# Patient Record
Sex: FEMALE
[De-identification: names, ages, dates, MRNs, and addresses within clinical notes are randomized per-mention and may not be internally consistent; named-entity substitution may affect disease eponyms.]

---

## 2024-06-05 ENCOUNTER — TRANSCRIPTION ENCOUNTER (OUTPATIENT)
Age: 54
End: 2024-06-05

## 2024-06-05 ENCOUNTER — APPOINTMENT (OUTPATIENT)
Dept: BREAST CENTER | Facility: CLINIC | Age: 54
End: 2024-06-05
Payer: COMMERCIAL

## 2024-06-05 VITALS
HEIGHT: 66 IN | WEIGHT: 183 LBS | DIASTOLIC BLOOD PRESSURE: 75 MMHG | HEART RATE: 77 BPM | SYSTOLIC BLOOD PRESSURE: 115 MMHG | BODY MASS INDEX: 29.41 KG/M2

## 2024-06-05 DIAGNOSIS — N63.0 UNSPECIFIED LUMP IN UNSPECIFIED BREAST: ICD-10-CM

## 2024-06-05 DIAGNOSIS — Z91.89 OTHER SPECIFIED PERSONAL RISK FACTORS, NOT ELSEWHERE CLASSIFIED: ICD-10-CM

## 2024-06-05 DIAGNOSIS — R92.8 OTHER ABNORMAL AND INCONCLUSIVE FINDINGS ON DIAGNOSTIC IMAGING OF BREAST: ICD-10-CM

## 2024-06-05 DIAGNOSIS — Z80.3 FAMILY HISTORY OF MALIGNANT NEOPLASM OF BREAST: ICD-10-CM

## 2024-06-05 DIAGNOSIS — R92.30 DENSE BREASTS, UNSPECIFIED: ICD-10-CM

## 2024-06-05 DIAGNOSIS — Z80.41 FAMILY HISTORY OF MALIGNANT NEOPLASM OF OVARY: ICD-10-CM

## 2024-06-05 PROBLEM — Z00.00 ENCOUNTER FOR PREVENTIVE HEALTH EXAMINATION: Status: ACTIVE | Noted: 2024-06-05

## 2024-06-05 PROCEDURE — 99204 OFFICE O/P NEW MOD 45 MIN: CPT

## 2024-06-05 PROCEDURE — 76642 ULTRASOUND BREAST LIMITED: CPT

## 2024-06-05 RX ORDER — ESTRADIOL 0.1 MG/D
0.1 PATCH, EXTENDED RELEASE TRANSDERMAL
Refills: 0 | Status: ACTIVE | COMMUNITY

## 2024-06-05 RX ORDER — LINACLOTIDE 290 UG/1
290 CAPSULE, GELATIN COATED ORAL
Refills: 0 | Status: ACTIVE | COMMUNITY

## 2024-06-05 RX ORDER — OMEPRAZOLE MAGNESIUM 20 MG/1
TABLET, DELAYED RELEASE ORAL
Refills: 0 | Status: ACTIVE | COMMUNITY

## 2024-06-05 RX ORDER — PROGESTERONE 100 MG/1
100 CAPSULE ORAL
Refills: 0 | Status: ACTIVE | COMMUNITY

## 2024-06-05 NOTE — DATA REVIEWED
[FreeTextEntry1] : 12/13/23 ([facility]) B/L sMMG/US: heterogeneously dense.  - Stable postsurgical architectural distortion of the left breast.  - Right breast at the 9:00 axis 3 cm from the nipple, there is a 0.5 x 0.4 x 0.7 cm unremarkable mass,, which measured 0.7 x 0.3 x 0.7 cm on ultrasound from 12/2/2022 (where this was labeled to be at the 9:00 axis 2 cm from the nipple). Continued sonographic follow-up at time of patient's bilateral annual mammogram in one year is recommended. - Left breast at the 9:00 axis 3 cm from the nipple, there is a 0.6 x 0.3 x 0.9 cm oval, circumscribed, hypoechoic, avascular mass in parallel orientation, which appears stable in size and more hypoechoic when compared to prior ultrasound from 12/9/2022. Continued sonographic follow-up at time of patient's bilateral annual mammogram in one year is recommended. IMPRESSION: Probably benign bilateral breast masses (R9N3 and L9N3), for which continued sonographic follow-up 6 at time of patient's bilateral annual mammogram in one year is recommended. FOLLOW-UP: Follow-up imaging in 12 months. Annual screening breast MRI is recommended in addition to mammography for women with a lifetime risk of breast cancer greater than 20%. Given dense breast tissue and strong family history of breast carcinoma, the patient is encouraged to discuss their individual risk of breast cancer with their referring health care provider. BI-RADS 3:  Probably benign.

## 2024-06-05 NOTE — FAMILY HISTORY
[TextEntry] : Sister 1 breast cancer age 31  Sister 2 breast cancer age 25  Paternal aunt breast cancer age 50's  Paternal cousin breast cancer age 41 Mother liver cancer 59 Maternal aunt breast cancer age 60's

## 2024-06-05 NOTE — HISTORY OF PRESENT ILLNESS
[FreeTextEntry1] : 54 year old female was referred by Dr. Claudia Sinclair who presents for initial evaluation regarding left UIQ breast lump and constant pain since April 2024. Denies any fever, chills, redness, pain, or discharge at the incision site. Patient denies nipple discharge, skin changes.  Most recent imaging: B/L sMMG/US 12/13/23 BIRADS-3 revealed probably benign b/l breast masses (0.7cm at R9N3 and 0.9cm at L9N3), with recommendation for continued short term f/u US at the time of patient's bilateral annual mammogram.   Patient with history of benign excisional left breast biopsy in 2005.  Famhx: Sister 1 breast cancer age 31 (BRCA+) Sister 2 breast cancer age 25 (BRCA+) Paternal aunt breast cancer age 50's  Paternal cousin breast cancer age 41 Mother liver cancer 59 Maternal aunt breast cancer age 60's  Patient had [employer] genetic testing performed in 2014, negative for pathogenic mutations; VUS in APC gene. Dilma Lifetime Risk 24.4%

## 2024-06-05 NOTE — PAST MEDICAL HISTORY
[Surgical Menopause] : The patient is in surgical menopause [Menarche Age ____] : age at menarche was [unfilled] [Total Preg ___] : G[unfilled] [Live Births ___] : P[unfilled]  [Ectopics ___] : ectopic pregnancies: [unfilled]  [Age At Live Birth ___] : Age at live birth: [unfilled] [History of Hormone Replacement Treatment] : has no history of hormone replacement treatment [de-identified] : partial hysterectomy [FreeTextEntry6] : No [FreeTextEntry7] : Yes in the past  [FreeTextEntry8] : No

## 2024-06-05 NOTE — ASSESSMENT
[FreeTextEntry1] : 54 year old female presents for initial evaluation regarding left breast lump and constant pain since April 2024. Patient with history of benign excisional left breast biopsy in 2005.   Patient with a strong family history of breast cancer, lifetime SHERYL risk of 24.4%. Discussed with the patient the implications for her lifetime risk, which is considered to be at high risk to develop breast cancer over the span of her lifetime and it is recommended that she undergoes high risk screening surveillance to include biannual radiological screening exams with a mammogram and screening MRI.   Discussed the standard recommendation to limit alcohol intake, follow a low-fat diet, avoid smoking, 150 minutes of exercise weekly. Reviewed prevention options from lifestyle, radiological surveillance, anti-estrogen maintenance, and prophylactic mastectomy.  Most recent imaging reviewed, B/L sMMG/US 12/13/23 BIRADS-3 revealed probably benign b/l breast masses (0.7cm at R9N3 and 0.9cm at L9N3), with recommendation for continued short term f/u US at the time of patient's bilateral annual mammogram.   Patient had [employer] genetic testing performed in 2014, negative for pathogenic mutations; VUS in APC gene.   L breast US performed in office today visualizing irregular hypoechoic mass a 10n4 at patient's area of concern. Plan for targeted L US and L US-guided biopsy as well as B/L high risk MRI now, and re-examination in 1 month. Patient verbalizes understanding and is in agreement with the plan.

## 2024-06-05 NOTE — CONSULT LETTER
[Dear  ___] : Dear ~MANNY, [Consult Letter:] : I had the pleasure of evaluating your patient, [unfilled]. [Please see my note below.] : Please see my note below. [Consult Closing:] : Thank you very much for allowing me to participate in the care of this patient.  If you have any questions, please do not hesitate to contact me. [Sincerely,] : Sincerely, [FreeTextEntry2] : Dr. Claudia Sinclair [FreeTextEntry3] : Ghanshyam Oneil MD Chief of Breast Surgery Director of Breast Cancer Program [facility]

## 2024-06-05 NOTE — PROCEDURE
[FreeTextEntry1] : L breast US  [FreeTextEntry2] : L breast lump and pain  [FreeTextEntry3] : Technique: a targeted left breast ultrasound was performed with evaluation of the UIQ US Findings: left breast irregular hypoechoic mass at 10 :00 4cmFN was detected 1.5cm x 0.8cm longitudinal in dimension No suspicious solid or complex cystic masses were detected

## 2024-06-12 ENCOUNTER — NON-APPOINTMENT (OUTPATIENT)
Age: 54
End: 2024-06-12

## 2024-06-20 ENCOUNTER — TRANSCRIPTION ENCOUNTER (OUTPATIENT)
Age: 54
End: 2024-06-20

## 2024-07-01 ENCOUNTER — NON-APPOINTMENT (OUTPATIENT)
Age: 54
End: 2024-07-01

## 2024-07-29 PROBLEM — Z98.890 S/P BREAST BIOPSY: Status: ACTIVE | Noted: 2024-07-29

## 2024-07-29 NOTE — PHYSICAL EXAM
[Supple] : supple [No Supraclavicular Adenopathy] : no supraclavicular adenopathy [Examined in the supine and seated position] : examined in the supine and seated position [No dominant masses] : no dominant masses in right breast  [No Nipple Retraction] : no left nipple retraction [No Nipple Discharge] : no left nipple discharge [No Axillary Lymphadenopathy] : no left axillary lymphadenopathy [No dominant masses] : no dominant masses left breast

## 2024-07-29 NOTE — REVIEW OF SYSTEMS
Alternate tylenol and ibuprofen as needed for pain.  Alternate ice and heat.   Follow-up with PCP within the next few days in order to be re-evaluated.  Return to the ED with any new or worsening symptoms.    [As Noted in HPI] : as noted in HPI [Negative] : Constitutional

## 2024-07-31 ENCOUNTER — APPOINTMENT (OUTPATIENT)
Dept: BREAST CENTER | Facility: CLINIC | Age: 54
End: 2024-07-31
Payer: COMMERCIAL

## 2024-07-31 VITALS
SYSTOLIC BLOOD PRESSURE: 115 MMHG | BODY MASS INDEX: 29.15 KG/M2 | HEIGHT: 66 IN | DIASTOLIC BLOOD PRESSURE: 63 MMHG | HEART RATE: 75 BPM | WEIGHT: 181.38 LBS

## 2024-07-31 DIAGNOSIS — Z91.89 OTHER SPECIFIED PERSONAL RISK FACTORS, NOT ELSEWHERE CLASSIFIED: ICD-10-CM

## 2024-07-31 DIAGNOSIS — Z80.3 FAMILY HISTORY OF MALIGNANT NEOPLASM OF BREAST: ICD-10-CM

## 2024-07-31 DIAGNOSIS — Z98.890 OTHER SPECIFIED POSTPROCEDURAL STATES: ICD-10-CM

## 2024-07-31 PROCEDURE — 99213 OFFICE O/P EST LOW 20 MIN: CPT

## 2024-08-02 NOTE — PAST MEDICAL HISTORY
[Surgical Menopause] : The patient is in surgical menopause [Menarche Age ____] : age at menarche was [unfilled] [Total Preg ___] : G[unfilled] [Live Births ___] : P[unfilled]  [Ectopics ___] : ectopic pregnancies: [unfilled]  [Age At Live Birth ___] : Age at live birth: [unfilled] [History of Hormone Replacement Treatment] : has no history of hormone replacement treatment [de-identified] : partial hysterectomy [FreeTextEntry7] : Yes in the past  [FreeTextEntry6] : No [FreeTextEntry8] : No

## 2024-08-02 NOTE — FAMILY HISTORY
[FreeTextEntry1] : Ms. HEAD is a 74 year old female who returns today in follow up with regard to a history of type 2 dm. She did have 2 coronary stents placed in January and then had gastric ulcer requiring iron transfusions Now on Plavix and Eliquis. Current dm medication include Metformin 500mg BID, Tradjenta 5mg and Glimepiride 1mg daily. HGM of late has shown values to be running  130s in the am. She reports she is checking every  day. There has been no significant hypoglycemia or hypoglycemic symptoms. Patient has gained 10 lbs since June. \par Denies any chest pain, sob, neurologic or ophthalmologic complaints. She too denies any new podiatric concerns. She is up to date with his ophthalmologic visit- Saw optjose June told stable-todl of beginning deion dash. Dr Yuen\par POCT A1c returned today at 5.6%  ; previously 6.2% from 8/5/2021\par POCT glucose returned today at  130  mg/dL \par Additional medical history includes that of hypertension, hyperlipidemia, and obesity along with with a-fib and vitamin d deficiency Is on D3 2,000 iu daily.\par Did have 2nd bout of kidney stone-dx as uric acid and is on potassium citrate.\par \par She was having some swelling to bilateral lower extremities and was started on lasix by . \par \par took diuretic and stayin nina lasix 40 mgg-,bnpp today. \par Had both doses of covid vaccine as well as booster,  [TextEntry] : Sister 1 breast cancer age 31  Sister 2 breast cancer age 25  Paternal aunt breast cancer age 50's  Paternal cousin breast cancer age 41 Mother liver cancer 59 Maternal aunt breast cancer age 60's

## 2024-08-02 NOTE — PAST MEDICAL HISTORY
[Surgical Menopause] : The patient is in surgical menopause [Menarche Age ____] : age at menarche was [unfilled] [Total Preg ___] : G[unfilled] [Live Births ___] : P[unfilled]  [Ectopics ___] : ectopic pregnancies: [unfilled]  [Age At Live Birth ___] : Age at live birth: [unfilled] [History of Hormone Replacement Treatment] : has no history of hormone replacement treatment [de-identified] : partial hysterectomy [FreeTextEntry7] : Yes in the past  [FreeTextEntry6] : No [FreeTextEntry8] : No

## 2024-08-02 NOTE — DATA REVIEWED
[FreeTextEntry1] : 12/13/23 ([facility]) B/L sMMG/US: heterogeneously dense.  - Stable postsurgical architectural distortion of the left breast.  - Right breast at the 9:00 axis 3 cm from the nipple, there is a 0.5 x 0.4 x 0.7 cm unremarkable mass,, which measured 0.7 x 0.3 x 0.7 cm on ultrasound from 12/2/2022 (where this was labeled to be at the 9:00 axis 2 cm from the nipple). Continued sonographic follow-up at time of patient's bilateral annual mammogram in one year is recommended. - Left breast at the 9:00 axis 3 cm from the nipple, there is a 0.6 x 0.3 x 0.9 cm oval, circumscribed, hypoechoic, avascular mass in parallel orientation, which appears stable in size and more hypoechoic when compared to prior ultrasound from 12/9/2022. Continued sonographic follow-up at time of patient's bilateral annual mammogram in one year is recommended. IMPRESSION: Probably benign bilateral breast masses (R9N3 and L9N3), for which continued sonographic follow-up 6 at time of patient's bilateral annual mammogram in one year is recommended. FOLLOW-UP: Follow-up imaging in 12 months. Annual screening breast MRI is recommended in addition to mammography for women with a lifetime risk of breast cancer greater than 20%. Given dense breast tissue and strong family history of breast carcinoma, the patient is encouraged to discuss their individual risk of breast cancer with their referring health care provider. BI-RADS 3:  Probably benign.   6/12/24 ([facility]) L US-guided biopsy: breast tissue with dense stromal fibrosis, benign and concordant. Follow Up: 6 month f/u B/L DX MMG/US.   6/12/24 ([facility]) B/L MRI: 1.0cm enhancing skin lesion R 6:00 inframammary fold. BI-RADS 2, benign. Follow Up: clinical correlation/assessment.

## 2024-08-02 NOTE — ASSESSMENT
[FreeTextEntry1] : 54 year old female presents for follow up evaluation regarding left breast lump and constant pain since April 2024. Patient with history of benign excisional left breast biopsy in 2005.   B/L sMMG/US 12/13/23 BIRADS-3 revealed probably benign b/l breast masses (0.7cm at R9N3 and 0.9cm at L9N3), with recommendation for continued short term f/u US at the time of patient's bilateral annual mammogram. Bedside US performed in-office at [facility] yielded L 10:00 4cmfn irregular hypoechoic mass corresponding to patients area of concern. Follow up L US-guided biopsy (6/12/24) path revealed breast tissue with dense stromal fibrosis, benign and concordant, with recommendation for 6 month f/u B/L DX MMG/US. Patient also had B/L MRI on the same day (6/12/24), BI-RADS 2, yielding a 1.0cm enhancing skin lesion R 6:00 inframammary fold, recommending clinical correlation/assessment.  [employer] genetic testing in 2014, negative for pathogenic mutations; VUS in APC gene.   Patient with a strong family history of breast cancer, lifetime SHERYL risk of 24.4%. Patient without complaints. Physical exam WNL. Most recent imaging reviewed, as above. Plan for B/L DX MMG/US and re-examination in Dec 2024. Patient verbalizes understanding and is in agreement with the plan.

## 2024-08-02 NOTE — PAST MEDICAL HISTORY
[Surgical Menopause] : The patient is in surgical menopause [Menarche Age ____] : age at menarche was [unfilled] [Total Preg ___] : G[unfilled] [Live Births ___] : P[unfilled]  [Ectopics ___] : ectopic pregnancies: [unfilled]  [Age At Live Birth ___] : Age at live birth: [unfilled] [History of Hormone Replacement Treatment] : has no history of hormone replacement treatment [de-identified] : partial hysterectomy [FreeTextEntry7] : Yes in the past  [FreeTextEntry6] : No [FreeTextEntry8] : No

## 2024-08-02 NOTE — HISTORY OF PRESENT ILLNESS
[FreeTextEntry1] : 54 year old female, patient of Dr. Claudia Sinclair, presents for follow up evaluation regarding left UIQ breast lump and constant pain since April 2024. B/L sMMG/US 12/13/23 BIRADS-3 revealed probably benign b/l breast masses (0.7cm at R9N3 and 0.9cm at L9N3), with recommendation for continued short term f/u US at the time of patient's bilateral annual mammogram.   Bedside US performed in-office at [facility] yielded L 10:00 4cmfn irregular hypoechoic mass corresponding to patients area of concern. Follow up L US-guided biopsy (6/12/24) path revealed breast tissue with dense stromal fibrosis, benign and concordant, with recommendation for 6 month f/u B/L DX MMG/US. Patient also had B/L MRI on the same day (6/12/24), BI-RADS 2, yielding a 1.0cm enhancing skin lesion R 6:00 inframammary fold, recommending clinical correlation/assessment.  Patient denies nipple discharge or skin changes.  Patient with history of benign excisional left breast biopsy in 2005.  Famhx: Sister 1 breast cancer age 31 (BRCA+) Sister 2 breast cancer age 25 (BRCA+) Paternal aunt breast cancer age 50's  Paternal cousin breast cancer age 41 Maternal aunt breast cancer age 60's  Patient had [employer] genetic testing performed in 2014, negative for pathogenic mutations; VUS in APC gene. Dilma Lifetime Risk 24.4%

## 2024-08-02 NOTE — PAST MEDICAL HISTORY
[Surgical Menopause] : The patient is in surgical menopause [Menarche Age ____] : age at menarche was [unfilled] [Total Preg ___] : G[unfilled] [Live Births ___] : P[unfilled]  [Ectopics ___] : ectopic pregnancies: [unfilled]  [Age At Live Birth ___] : Age at live birth: [unfilled] [History of Hormone Replacement Treatment] : has no history of hormone replacement treatment [de-identified] : partial hysterectomy [FreeTextEntry7] : Yes in the past  [FreeTextEntry6] : No [FreeTextEntry8] : No

## 2024-08-02 NOTE — ASSESSMENT
[FreeTextEntry1] : 54 year old female presents for follow up evaluation regarding left breast lump and constant pain since April 2024. Patient with history of benign excisional left breast biopsy in 2005.   B/L sMMG/US 12/13/23 BIRADS-3 revealed probably benign b/l breast masses (0.7cm at R9N3 and 0.9cm at L9N3), with recommendation for continued short term f/u US at the time of patient's bilateral annual mammogram. Bedside US performed in-office at [facility] yielded L 10:00 4cmfn irregular hypoechoic mass corresponding to patients area of concern. Follow up L US-guided biopsy (6/12/24) path revealed breast tissue with dense stromal fibrosis, benign and concordant, with recommendation for 6 month f/u B/L DX MMG/US. Patient also had B/L MRI on the same day (6/12/24), BI-RADS 2, yielding a 1.0cm enhancing skin lesion R 6:00 inframammary fold, recommending clinical correlation/assessment.  [employer] genetic testing in 2014, negative for pathogenic mutations; VUS in APC gene.   Patient with a strong family history of breast cancer, lifetime SHERYL risk of 24.4%. Patient without complaints. Physical exam WNL. Most recent imaging reviewed, as above. Plan for B/L DX MMG/US and re-examination in Dec 2024. Patient verbalizes understanding and is in agreement with the plan.

## 2024-08-02 NOTE — DATA REVIEWED
[FreeTextEntry1] : 12/13/23 ([facility]) B/L sMMG/US: heterogeneously dense.  - Stable postsurgical architectural distortion of the left breast.  - Right breast at the 9:00 axis 3 cm from the nipple, there is a 0.5 x 0.4 x 0.7 cm unremarkable mass,, which measured 0.7 x 0.3 x 0.7 cm on ultrasound from 12/2/2022 (where this was labeled to be at the 9:00 axis 2 cm from the nipple). Continued sonographic follow-up at time of patient's bilateral annual mammogram in one year is recommended. - Left breast at the 9:00 axis 3 cm from the nipple, there is a 0.6 x 0.3 x 0.9 cm oval, circumscribed, hypoechoic, avascular mass in parallel orientation, which appears stable in size and more hypoechoic when compared to prior ultrasound from 12/9/2022. Continued sonographic follow-up at time of patient's bilateral annual mammogram in one year is recommended. IMPRESSION: Probably benign bilateral breast masses (R9N3 and L9N3), for which continued sonographic follow-up 6 at time of patient's bilateral annual mammogram in one year is recommended. FOLLOW-UP: Follow-up imaging in 12 months. Annual screening breast MRI is recommended in addition to mammography for women with a lifetime risk of breast cancer greater than 20%. Given dense breast tissue and strong family history of breast carcinoma, the patient is encouraged to discuss their individual risk of breast cancer with their referring health care provider. BI-RADS 3:  Probably benign.   6/12/24 ([facility]) L US-guided biopsy: breast tissue with dense stromal fibrosis, benign and concordant. Follow Up: 6 month f/u B/L DX MMG/US.   6/12/24 ([facility]) B/L MRI: 1.0cm enhancing skin lesion R 6:00 inframammary fold. BI-RADS 2, benign. Follow Up: clinical correlation/assessment.

## 2024-08-02 NOTE — ASSESSMENT
[FreeTextEntry1] : 54 year old female presents for follow up evaluation regarding left breast lump and constant pain since April 2024. Patient with history of benign excisional left breast biopsy in 2005.   B/L sMMG/US 12/13/23 BIRADS-3 revealed probably benign b/l breast masses (0.7cm at R9N3 and 0.9cm at L9N3), with recommendation for continued short term f/u US at the time of patient's bilateral annual mammogram. Bedside US performed in-office at [facility] yielded L 10:00 4cmfn irregular hypoechoic mass corresponding to patients area of concern. Follow up L US-guided biopsy (6/12/24) path revealed breast tissue with dense stromal fibrosis, benign and concordant, with recommendation for 6 month f/u B/L DX MMG/US. Patient also had B/L MRI on the same day (6/12/24), BI-RADS 2, yielding a 1.0cm enhancing skin lesion R 6:00 inframammary fold, recommending clinical correlation/assessment.  [employer] genetic testing in 2014, negative for pathogenic mutations; VUS in APC gene.   Patient with a strong family history of breast cancer, lifetime SHERYL risk of 24.4%. Patient without complaints. Physical exam WNL. Most recent imaging reviewed, as above. Plan for B/L DX MMG/US and re-examination in Dec 2024. Patient verbalizes understanding and is in agreement with the plan.

## 2024-08-02 NOTE — DATA REVIEWED
[FreeTextEntry1] : 12/13/23 ([facility]) B/L sMMG/US: heterogeneously dense.  - Stable postsurgical architectural distortion of the left breast.  - Right breast at the 9:00 axis 3 cm from the nipple, there is a 0.5 x 0.4 x 0.7 cm unremarkable mass,, which measured 0.7 x 0.3 x 0.7 cm on ultrasound from 12/2/2022 (where this was labeled to be at the 9:00 axis 2 cm from the nipple). Continued sonographic follow-up at time of patient's bilateral annual mammogram in one year is recommended. - Left breast at the 9:00 axis 3 cm from the nipple, there is a 0.6 x 0.3 x 0.9 cm oval, circumscribed, hypoechoic, avascular mass in parallel orientation, which appears stable in size and more hypoechoic when compared to prior ultrasound from 12/9/2022. Continued sonographic follow-up at time of patient's bilateral annual mammogram in one year is recommended. IMPRESSION: Probably benign bilateral breast masses (R9N3 and L9N3), for which continued sonographic follow-up 6 at time of patient's bilateral annual mammogram in one year is recommended. FOLLOW-UP: Follow-up imaging in 12 months. Annual screening breast MRI is recommended in addition to mammography for women with a lifetime risk of breast cancer greater than 20%. Given dense breast tissue and strong family history of breast carcinoma, the patient is encouraged to discuss their individual risk of breast cancer with their referring health care provider. BI-RADS 3:  Probably benign.   6/12/24 ([facility]) L US-guided biopsy: breast tissue with dense stromal fibrosis, benign and concordant. Follow Up: 6 month f/u B/L DX MMG/US.   6/12/24 ([facility]) B/L MRI: 1.0cm enhancing skin lesion R 6:00 inframammary fold. BI-RADS 2, benign. Follow Up: clinical correlation/assessment.

## 2024-08-02 NOTE — ASSESSMENT
[FreeTextEntry1] : 54 year old female presents for follow up evaluation regarding left breast lump and constant pain since April 2024. Patient with history of benign excisional left breast biopsy in 2005.   B/L sMMG/US 12/13/23 BIRADS-3 revealed probably benign b/l breast masses (0.7cm at R9N3 and 0.9cm at L9N3), with recommendation for continued short term f/u US at the time of patient's bilateral annual mammogram. Bedside US performed in-office at [facility] yielded L 10:00 4cmfn irregular hypoechoic mass corresponding to patients area of concern. Follow up L US-guided biopsy (6/12/24) path revealed breast tissue with dense stromal fibrosis, benign and concordant, with recommendation for 6 month f/u B/L DX MMG/US. Patient also had B/L MRI on the same day (6/12/24), BI-RADS 2, yielding a 1.0cm enhancing skin lesion R 6:00 inframammary fold, recommending clinical correlation/assessment.  [employer] genetic testing in 2014, negative for pathogenic mutations; VUS in APC gene.   Patient with a strong family history of breast cancer, lifetime SHERYL risk of 24.4%. Patient without complaints. Physical exam WNL. Most recent imaging reviewed, as above. Plan for B/L DX MMG/US and re-examination in Dec 2024. Patient verbalizes understanding and is in agreement with the plan.

## 2024-08-02 NOTE — DATA REVIEWED
[FreeTextEntry1] : 12/13/23 ([facility]) B/L sMMG/US: heterogeneously dense.  - Stable postsurgical architectural distortion of the left breast.  - Right breast at the 9:00 axis 3 cm from the nipple, there is a 0.5 x 0.4 x 0.7 cm unremarkable mass,, which measured 0.7 x 0.3 x 0.7 cm on ultrasound from 12/2/2022 (where this was labeled to be at the 9:00 axis 2 cm from the nipple). Continued sonographic follow-up at time of patient's bilateral annual mammogram in one year is recommended. - Left breast at the 9:00 axis 3 cm from the nipple, there is a 0.6 x 0.3 x 0.9 cm oval, circumscribed, hypoechoic, avascular mass in parallel orientation, which appears stable in size and more hypoechoic when compared to prior ultrasound from 12/9/2022. Continued sonographic follow-up at time of patient's bilateral annual mammogram in one year is recommended. IMPRESSION: Probably benign bilateral breast masses (R9N3 and L9N3), for which continued sonographic follow-up 6 at time of patient's bilateral annual mammogram in one year is recommended. FOLLOW-UP: Follow-up imaging in 12 months. Annual screening breast MRI is recommended in addition to mammography for women with a lifetime risk of breast cancer greater than 20%. Given dense breast tissue and strong family history of breast carcinoma, the patient is encouraged to discuss their individual risk of breast cancer with their referring health care provider. BI-RADS 3:  Probably benign.   6/12/24 ([facility]) L US-guided biopsy: breast tissue with dense stromal fibrosis, benign and concordant. Follow Up: 6 month f/u B/L DX MMG/US.   6/12/24 ([facility]) B/L MRI: 1.0cm enhancing skin lesion R 6:00 inframammary fold. BI-RADS 2, benign. Follow Up: clinical correlation/assessment.

## 2024-12-11 ENCOUNTER — EMERGENCY (EMERGENCY)
Facility: HOSPITAL | Age: 54
LOS: 1 days | Discharge: HOME | End: 2024-12-11
Attending: STUDENT IN AN ORGANIZED HEALTH CARE EDUCATION/TRAINING PROGRAM | Admitting: STUDENT IN AN ORGANIZED HEALTH CARE EDUCATION/TRAINING PROGRAM
Payer: COMMERCIAL

## 2024-12-11 VITALS
TEMPERATURE: 98 F | SYSTOLIC BLOOD PRESSURE: 190 MMHG | RESPIRATION RATE: 19 BRPM | HEART RATE: 85 BPM | DIASTOLIC BLOOD PRESSURE: 107 MMHG | HEIGHT: 66 IN | WEIGHT: 182.1 LBS | OXYGEN SATURATION: 99 %

## 2024-12-11 VITALS
SYSTOLIC BLOOD PRESSURE: 145 MMHG | OXYGEN SATURATION: 97 % | RESPIRATION RATE: 18 BRPM | TEMPERATURE: 98 F | HEART RATE: 54 BPM | DIASTOLIC BLOOD PRESSURE: 82 MMHG

## 2024-12-11 LAB
ANION GAP SERPL CALC-SCNC: 8 MMOL/L — SIGNIFICANT CHANGE UP (ref 5–17)
BASOPHILS # BLD AUTO: 0.04 K/UL — SIGNIFICANT CHANGE UP (ref 0–0.2)
BASOPHILS NFR BLD AUTO: 0.8 % — SIGNIFICANT CHANGE UP (ref 0–2)
BUN SERPL-MCNC: 10 MG/DL — SIGNIFICANT CHANGE UP (ref 7–23)
CALCIUM SERPL-MCNC: 9.4 MG/DL — SIGNIFICANT CHANGE UP (ref 8.4–10.5)
CHLORIDE SERPL-SCNC: 103 MMOL/L — SIGNIFICANT CHANGE UP (ref 96–108)
CO2 SERPL-SCNC: 26 MMOL/L — SIGNIFICANT CHANGE UP (ref 22–31)
CREAT SERPL-MCNC: 0.64 MG/DL — SIGNIFICANT CHANGE UP (ref 0.5–1.3)
D DIMER BLD IA.RAPID-MCNC: 209 NG/ML DDU — SIGNIFICANT CHANGE UP
EGFR: 104 ML/MIN/1.73M2 — SIGNIFICANT CHANGE UP
EOSINOPHIL # BLD AUTO: 0.16 K/UL — SIGNIFICANT CHANGE UP (ref 0–0.5)
EOSINOPHIL NFR BLD AUTO: 3.4 % — SIGNIFICANT CHANGE UP (ref 0–6)
GLUCOSE SERPL-MCNC: 91 MG/DL — SIGNIFICANT CHANGE UP (ref 70–99)
HCT VFR BLD CALC: 37.9 % — SIGNIFICANT CHANGE UP (ref 34.5–45)
HGB BLD-MCNC: 12.6 G/DL — SIGNIFICANT CHANGE UP (ref 11.5–15.5)
IMM GRANULOCYTES NFR BLD AUTO: 0.2 % — SIGNIFICANT CHANGE UP (ref 0–0.9)
LIDOCAIN IGE QN: 21 U/L — SIGNIFICANT CHANGE UP (ref 7–60)
LYMPHOCYTES # BLD AUTO: 1.63 K/UL — SIGNIFICANT CHANGE UP (ref 1–3.3)
LYMPHOCYTES # BLD AUTO: 34.2 % — SIGNIFICANT CHANGE UP (ref 13–44)
MCHC RBC-ENTMCNC: 29.3 PG — SIGNIFICANT CHANGE UP (ref 27–34)
MCHC RBC-ENTMCNC: 33.2 G/DL — SIGNIFICANT CHANGE UP (ref 32–36)
MCV RBC AUTO: 88.1 FL — SIGNIFICANT CHANGE UP (ref 80–100)
MONOCYTES # BLD AUTO: 0.43 K/UL — SIGNIFICANT CHANGE UP (ref 0–0.9)
MONOCYTES NFR BLD AUTO: 9 % — SIGNIFICANT CHANGE UP (ref 2–14)
NEUTROPHILS # BLD AUTO: 2.49 K/UL — SIGNIFICANT CHANGE UP (ref 1.8–7.4)
NEUTROPHILS NFR BLD AUTO: 52.4 % — SIGNIFICANT CHANGE UP (ref 43–77)
NRBC # BLD: 0 /100 WBCS — SIGNIFICANT CHANGE UP (ref 0–0)
NT-PROBNP SERPL-SCNC: 77 PG/ML — SIGNIFICANT CHANGE UP (ref 0–300)
PLATELET # BLD AUTO: 246 K/UL — SIGNIFICANT CHANGE UP (ref 150–400)
POTASSIUM SERPL-MCNC: 4.2 MMOL/L — SIGNIFICANT CHANGE UP (ref 3.5–5.3)
POTASSIUM SERPL-SCNC: 4.2 MMOL/L — SIGNIFICANT CHANGE UP (ref 3.5–5.3)
RBC # BLD: 4.3 M/UL — SIGNIFICANT CHANGE UP (ref 3.8–5.2)
RBC # FLD: 13.6 % — SIGNIFICANT CHANGE UP (ref 10.3–14.5)
SODIUM SERPL-SCNC: 137 MMOL/L — SIGNIFICANT CHANGE UP (ref 135–145)
TROPONIN T, HIGH SENSITIVITY RESULT: <6 NG/L — SIGNIFICANT CHANGE UP (ref 0–51)
WBC # BLD: 4.76 K/UL — SIGNIFICANT CHANGE UP (ref 3.8–10.5)
WBC # FLD AUTO: 4.76 K/UL — SIGNIFICANT CHANGE UP (ref 3.8–10.5)

## 2024-12-11 PROCEDURE — 85379 FIBRIN DEGRADATION QUANT: CPT

## 2024-12-11 PROCEDURE — 80048 BASIC METABOLIC PNL TOTAL CA: CPT

## 2024-12-11 PROCEDURE — 71045 X-RAY EXAM CHEST 1 VIEW: CPT | Mod: 26

## 2024-12-11 PROCEDURE — 99284 EMERGENCY DEPT VISIT MOD MDM: CPT | Mod: 25

## 2024-12-11 PROCEDURE — 83880 ASSAY OF NATRIURETIC PEPTIDE: CPT

## 2024-12-11 PROCEDURE — 99285 EMERGENCY DEPT VISIT HI MDM: CPT

## 2024-12-11 PROCEDURE — 71045 X-RAY EXAM CHEST 1 VIEW: CPT

## 2024-12-11 PROCEDURE — 85025 COMPLETE CBC W/AUTO DIFF WBC: CPT

## 2024-12-11 PROCEDURE — 83690 ASSAY OF LIPASE: CPT

## 2024-12-11 PROCEDURE — 36415 COLL VENOUS BLD VENIPUNCTURE: CPT

## 2024-12-11 PROCEDURE — 84484 ASSAY OF TROPONIN QUANT: CPT

## 2024-12-11 PROCEDURE — 96374 THER/PROPH/DIAG INJ IV PUSH: CPT

## 2024-12-11 RX ORDER — KETOROLAC TROMETHAMINE 30 MG/ML
15 INJECTION INTRAMUSCULAR; INTRAVENOUS ONCE
Refills: 0 | Status: DISCONTINUED | OUTPATIENT
Start: 2024-12-11 | End: 2024-12-11

## 2024-12-11 RX ORDER — SODIUM CHLORIDE 9 MG/ML
1000 INJECTION, SOLUTION INTRAMUSCULAR; INTRAVENOUS; SUBCUTANEOUS ONCE
Refills: 0 | Status: COMPLETED | OUTPATIENT
Start: 2024-12-11 | End: 2024-12-11

## 2024-12-11 RX ADMIN — SODIUM CHLORIDE 1000 MILLILITER(S): 9 INJECTION, SOLUTION INTRAMUSCULAR; INTRAVENOUS; SUBCUTANEOUS at 10:20

## 2024-12-11 RX ADMIN — KETOROLAC TROMETHAMINE 15 MILLIGRAM(S): 30 INJECTION INTRAMUSCULAR; INTRAVENOUS at 10:21

## 2024-12-11 NOTE — ED PROVIDER NOTE - OBJECTIVE STATEMENT
The pt is a 53 y/o F, who presents to ED c/o L shoulder/chest pain x 1 d. Pt states that pain is 5/10, constant, dull, no aggravating or alleviating factors, took an aleve this am for a ha. States that overall feels tired and lack of energy. Denies fevers, chills, sob, cough, palpitations, n/v/d, abd pain, dizziness, syncope.

## 2024-12-11 NOTE — ED ADULT NURSE NOTE - OBJECTIVE STATEMENT
55yo female pmh HLD presents with L leg pain, L jaw pain, chest heaviness onset yesterday. noted hypertensive, no hx of HTN. well appearing, nondiaphoretic. denies sob, n/v,

## 2024-12-11 NOTE — ED PROVIDER NOTE - PATIENT PORTAL LINK FT
You can access the FollowMyHealth Patient Portal offered by [facility] by registering at the following website: [email or web address]. By joining [employer]’s FollowMyHealth portal, you will also be able to view your health information using other applications (apps) compatible with our system.

## 2024-12-11 NOTE — ED ADULT TRIAGE NOTE - CHIEF COMPLAINT QUOTE
c/o fatigue x4 days, LUE pain since yesterday, left jaw pain since last night, and chest heaviness on her way in just now. h/o HLD. Denies h/o HTN. 190/107 in triage. EKG completed.

## 2024-12-11 NOTE — ED PROVIDER NOTE - CLINICAL SUMMARY MEDICAL DECISION MAKING FREE TEXT BOX
Shalini Rush  : 1946  Primary: [employer] Medicare Hmo/p*  Secondary: MEDICAID [facility] @ [address]  SUJIT Denson  [facility] [address]  Phone: [phone]  Fax: [phone]    PT Visit Info:    Total # of Visits to Date: 2  Progress Note Counter: 4  Progress Note Due Date: 24  No Show: 1 (1/10/24)  Canceled Appointment: 1 (24)     OT Visit Info:  No data recorded    OUTPATIENT THERAPY: 2024  Episode  Appt Desk        Shalini Rush cancelled her appointment for today (>24 hours ago) due to illness.  Will plan to follow up next during next appointment.  Thank you,  SARAH BRAVO, PT    Future Appointments   Date Time Provider Department Center   2024  3:30 PM SFE CT REV [facility]   2024  7:00 PM Sarah Bravo, PT [facility]   2024 10:15 AM Sarah Bravo, PT [facility]PT [facility]   2024  9:40 AM Nikolas Dunham MD [facility] AMB   2024  9:00 AM Jessica Flores AuD [facility] AMB   2024  9:00 AM Dunia Gonzales PA ENTG [facility] AMB   2024  1:00 PM Sarah Bravo, PT [facility]   2024  9:10 AM [facility]   2024  9:45 AM Connor Enciso MD [facility] AM[facility]         pt c/o l cp/shoulder pain x 1 d, non exertional, no sob/dizziness/syncope, ekg non ischemic, cxr clear, labs wnl, given toradol w/good relief, ? msk vs pleuritic, no rash to suggest zoster, do not suspect acs or pe or dissection, hemodynamically stable for dc, to con't ibuprofen, f/u w/pmd, pt understands and agrees w/plan, strict return precautions given

## 2024-12-13 DIAGNOSIS — M25.512 PAIN IN LEFT SHOULDER: ICD-10-CM

## 2024-12-13 DIAGNOSIS — R07.89 OTHER CHEST PAIN: ICD-10-CM

## 2024-12-13 DIAGNOSIS — Z88.5 ALLERGY STATUS TO NARCOTIC AGENT: ICD-10-CM

## 2025-01-08 ENCOUNTER — APPOINTMENT (OUTPATIENT)
Dept: BREAST CENTER | Facility: CLINIC | Age: 55
End: 2025-01-08

## 2025-07-23 ENCOUNTER — TRANSCRIPTION ENCOUNTER (OUTPATIENT)
Age: 55
End: 2025-07-23

## 2025-08-13 ENCOUNTER — APPOINTMENT (OUTPATIENT)
Dept: BREAST CENTER | Facility: CLINIC | Age: 55
End: 2025-08-13

## 2025-08-13 ENCOUNTER — NON-APPOINTMENT (OUTPATIENT)
Age: 55
End: 2025-08-13

## 2025-08-13 VITALS
HEART RATE: 71 BPM | HEIGHT: 66 IN | SYSTOLIC BLOOD PRESSURE: 117 MMHG | BODY MASS INDEX: 25.39 KG/M2 | DIASTOLIC BLOOD PRESSURE: 70 MMHG | WEIGHT: 158 LBS

## 2025-08-13 DIAGNOSIS — N63.0 UNSPECIFIED LUMP IN UNSPECIFIED BREAST: ICD-10-CM

## 2025-08-13 DIAGNOSIS — R92.30 DENSE BREASTS, UNSPECIFIED: ICD-10-CM

## 2025-08-13 DIAGNOSIS — Z80.3 FAMILY HISTORY OF MALIGNANT NEOPLASM OF BREAST: ICD-10-CM

## 2025-08-13 DIAGNOSIS — R92.8 OTHER ABNORMAL AND INCONCLUSIVE FINDINGS ON DIAGNOSTIC IMAGING OF BREAST: ICD-10-CM

## 2025-08-13 DIAGNOSIS — R53.83 OTHER FATIGUE: ICD-10-CM

## 2025-08-13 DIAGNOSIS — Z91.89 OTHER SPECIFIED PERSONAL RISK FACTORS, NOT ELSEWHERE CLASSIFIED: ICD-10-CM

## 2025-08-13 DIAGNOSIS — F41.9 ANXIETY DISORDER, UNSPECIFIED: ICD-10-CM

## 2025-08-13 DIAGNOSIS — Z80.41 FAMILY HISTORY OF MALIGNANT NEOPLASM OF OVARY: ICD-10-CM

## 2025-08-13 PROCEDURE — 99213 OFFICE O/P EST LOW 20 MIN: CPT
